# Patient Record
Sex: FEMALE | URBAN - METROPOLITAN AREA
[De-identification: names, ages, dates, MRNs, and addresses within clinical notes are randomized per-mention and may not be internally consistent; named-entity substitution may affect disease eponyms.]

---

## 2021-05-27 ENCOUNTER — APPOINTMENT (OUTPATIENT)
Dept: URBAN - METROPOLITAN AREA CLINIC 257 | Age: 71
Setting detail: DERMATOLOGY
End: 2021-05-27

## 2021-05-27 DIAGNOSIS — L82.1 OTHER SEBORRHEIC KERATOSIS: ICD-10-CM

## 2021-05-27 DIAGNOSIS — L57.0 ACTINIC KERATOSIS: ICD-10-CM

## 2021-05-27 DIAGNOSIS — L81.4 OTHER MELANIN HYPERPIGMENTATION: ICD-10-CM

## 2021-05-27 PROBLEM — D48.5 NEOPLASM OF UNCERTAIN BEHAVIOR OF SKIN: Status: ACTIVE | Noted: 2021-05-27

## 2021-05-27 PROCEDURE — OTHER LIQUID NITROGEN: OTHER

## 2021-05-27 PROCEDURE — OTHER BIOPSY BY SHAVE METHOD: OTHER

## 2021-05-27 PROCEDURE — OTHER COUNSELING: OTHER

## 2021-05-27 PROCEDURE — 11103 TANGNTL BX SKIN EA SEP/ADDL: CPT

## 2021-05-27 PROCEDURE — 17000 DESTRUCT PREMALG LESION: CPT | Mod: 59

## 2021-05-27 PROCEDURE — 11102 TANGNTL BX SKIN SINGLE LES: CPT

## 2021-05-27 PROCEDURE — 99203 OFFICE O/P NEW LOW 30 MIN: CPT | Mod: 25

## 2021-05-27 PROCEDURE — OTHER MIPS QUALITY: OTHER

## 2021-05-27 ASSESSMENT — LOCATION SIMPLE DESCRIPTION DERM
LOCATION SIMPLE: CHEST
LOCATION SIMPLE: LEFT CHEEK
LOCATION SIMPLE: RIGHT CHEEK
LOCATION SIMPLE: RIGHT LIP

## 2021-05-27 ASSESSMENT — LOCATION DETAILED DESCRIPTION DERM
LOCATION DETAILED: STERNAL NOTCH
LOCATION DETAILED: RIGHT UPPER CUTANEOUS LIP
LOCATION DETAILED: RIGHT INFERIOR CENTRAL MALAR CHEEK
LOCATION DETAILED: LEFT MEDIAL MALAR CHEEK

## 2021-05-27 ASSESSMENT — LOCATION ZONE DERM
LOCATION ZONE: TRUNK
LOCATION ZONE: LIP
LOCATION ZONE: FACE

## 2021-05-27 NOTE — PROCEDURE: BIOPSY BY SHAVE METHOD
Was A Bandage Applied: Yes
Hide Second Anesthesia?: No
Electrodesiccation And Curettage Text: The wound bed was treated with electrodesiccation and curettage after the biopsy was performed.
Notification Instructions: Patient will be notified of biopsy results. However, patient instructed to call the office if not contacted within 2 weeks.
Biopsy Method: Dermablade
Silver Nitrate Text: The wound bed was treated with silver nitrate after the biopsy was performed.
Dressing: bandage
Type Of Destruction Used: Curettage
Size Of Lesion In Cm: 0
Information: Selecting Yes will display possible errors in your note based on the variables you have selected. This validation is only offered as a suggestion for you. PLEASE NOTE THAT THE VALIDATION TEXT WILL BE REMOVED WHEN YOU FINALIZE YOUR NOTE. IF YOU WANT TO FAX A PRELIMINARY NOTE YOU WILL NEED TO TOGGLE THIS TO 'NO' IF YOU DO NOT WANT IT IN YOUR FAXED NOTE.
Hemostasis: Drysol
Billing Type: Third-Party Bill
Consent: Written consent was obtained and risks were reviewed including but not limited to scarring, infection, bleeding, scabbing, incomplete removal, nerve damage and allergy to anesthesia.
Electrodesiccation Text: The wound bed was treated with electrodesiccation after the biopsy was performed.
Anesthesia Volume In Cc (Will Not Render If 0): 0.5
Wound Care: Petrolatum
Detail Level: Simple
Biopsy Type: H and E
Post-Care Instructions: I reviewed with the patient in detail post-care instructions. Patient is to keep the biopsy site dry overnight, and then apply bacitracin twice daily until healed. Patient may apply hydrogen peroxide soaks to remove any crusting.
Curettage Text: The wound bed was treated with curettage after the biopsy was performed.
Depth Of Biopsy: dermis
Cryotherapy Text: The wound bed was treated with cryotherapy after the biopsy was performed.
Anesthesia Type: 1% lidocaine with epinephrine
Body Location Override (Optional - Billing Will Still Be Based On Selected Body Map Location If Applicable): left superior anterior neck

## 2021-05-27 NOTE — PROCEDURE: MIPS QUALITY
Detail Level: Detailed
Quality 431: Preventive Care And Screening: Unhealthy Alcohol Use - Screening: Patient screened for unhealthy alcohol use using a single question and scores less than 2 times per year
Quality 110: Preventive Care And Screening: Influenza Immunization: Influenza Immunization Ordered or Recommended, but not Administered due to system reason
Quality 111:Pneumonia Vaccination Status For Older Adults: Pneumococcal Vaccination not Administered or Previously Received, Reason not Otherwise Specified
Quality 130: Documentation Of Current Medications In The Medical Record: Current Medications Documented
Quality 226: Preventive Care And Screening: Tobacco Use: Screening And Cessation Intervention: Patient screened for tobacco use and is an ex/non-smoker

## 2021-05-27 NOTE — PROCEDURE: LIQUID NITROGEN
Duration Of Freeze Thaw-Cycle (Seconds): 1
Render Note In Bullet Format When Appropriate: No
Post-Care Instructions: I reviewed with the patient in detail post-care instructions. Patient is to wear sunprotection, and avoid picking at any of the treated lesions. Pt may apply Vaseline to crusted or scabbing areas.
Detail Level: Simple
Render Post-Care Instructions In Note?: yes
Number Of Freeze-Thaw Cycles: 1 freeze-thaw cycle
Consent: The patient's consent was obtained including but not limited to risks of crusting, scabbing, blistering, scarring, darker or lighter pigmentary change, recurrence, incomplete removal and infection.

## 2021-06-17 ENCOUNTER — APPOINTMENT (OUTPATIENT)
Dept: URBAN - METROPOLITAN AREA CLINIC 257 | Age: 71
Setting detail: DERMATOLOGY
End: 2021-06-17

## 2021-06-17 DIAGNOSIS — L57.8 OTHER SKIN CHANGES DUE TO CHRONIC EXPOSURE TO NONIONIZING RADIATION: ICD-10-CM

## 2021-06-17 DIAGNOSIS — L81.4 OTHER MELANIN HYPERPIGMENTATION: ICD-10-CM

## 2021-06-17 DIAGNOSIS — L57.0 ACTINIC KERATOSIS: ICD-10-CM

## 2021-06-17 PROBLEM — C44.41 BASAL CELL CARCINOMA OF SKIN OF SCALP AND NECK: Status: ACTIVE | Noted: 2021-06-17

## 2021-06-17 PROCEDURE — OTHER LIQUID NITROGEN: OTHER

## 2021-06-17 PROCEDURE — OTHER COUNSELING: OTHER

## 2021-06-17 PROCEDURE — 17003 DESTRUCT PREMALG LES 2-14: CPT

## 2021-06-17 PROCEDURE — OTHER CURETTAGE AND DESTRUCTION: OTHER

## 2021-06-17 PROCEDURE — 17000 DESTRUCT PREMALG LESION: CPT | Mod: 59

## 2021-06-17 PROCEDURE — 17273 DSTR MAL LES S/N/H/F/G 2.1-3: CPT

## 2021-06-17 PROCEDURE — 99213 OFFICE O/P EST LOW 20 MIN: CPT | Mod: 25

## 2021-06-17 ASSESSMENT — LOCATION SIMPLE DESCRIPTION DERM
LOCATION SIMPLE: LEFT UPPER BACK
LOCATION SIMPLE: CHEST
LOCATION SIMPLE: CHEST

## 2021-06-17 ASSESSMENT — LOCATION ZONE DERM
LOCATION ZONE: TRUNK
LOCATION ZONE: TRUNK

## 2021-06-17 ASSESSMENT — LOCATION DETAILED DESCRIPTION DERM
LOCATION DETAILED: LEFT LATERAL SUPERIOR CHEST
LOCATION DETAILED: UPPER STERNUM
LOCATION DETAILED: RIGHT LATERAL SUPERIOR CHEST
LOCATION DETAILED: LEFT MID-UPPER BACK

## 2021-06-17 NOTE — PROCEDURE: LIQUID NITROGEN
Duration Of Freeze Thaw-Cycle (Seconds): 1
Consent: The patient's consent was obtained including but not limited to risks of crusting, scabbing, blistering, scarring, darker or lighter pigmentary change, recurrence, incomplete removal and infection.
Render Post-Care Instructions In Note?: no
Detail Level: Generalized
Number Of Freeze-Thaw Cycles: 1 freeze-thaw cycle
Post-Care Instructions: I reviewed with the patient in detail post-care instructions. Patient is to wear sunprotection, and avoid picking at any of the treated lesions. Pt may apply Vaseline to crusted or scabbing areas.

## 2021-06-17 NOTE — PROCEDURE: CURETTAGE AND DESTRUCTION
Size Of Lesion After Curettage: 2.1
Post-Care Instructions: I reviewed with the patient in detail post-care instructions. Patient is to keep the area dry for 48 hours, and not to engage in any swimming until the area is healed. Should the patient develop any fevers, chills, bleeding, severe pain patient will contact the office immediately.
Biopsy Photograph Reviewed: No
Final Size Statement: The size of the lesion after curettage was
What Was Performed First?: Curettage
Size Of Lesion In Cm: 1.1
Concentration (Mg/Ml Or Millions Of Plaque Forming Units/Cc): 0.01
Consent was obtained from the patient. The risks, benefits and alternatives to therapy were discussed in detail. Specifically, the risks of infection, scarring, bleeding, prolonged wound healing, nerve injury, incomplete removal, allergy to anesthesia and recurrence were addressed. Alternatives to ED&C, such as: surgical removal and XRT were also discussed.  Prior to the procedure, the treatment site was clearly identified and confirmed by the patient. All components of Universal Protocol/PAUSE Rule completed.
Number Of Curettages: 3
Additional Information: (Optional): The wound was cleaned, and a pressure dressing was applied.  The patient received detailed post-op instructions.
Total Volume (Ccs): 1
Bill As A Line Item Or As Units: Line Item
Anesthesia Type: 1% lidocaine with epinephrine
Detail Level: Detailed
Cautery Type: cryotherapy

## 2021-06-21 ENCOUNTER — APPOINTMENT (OUTPATIENT)
Dept: URBAN - METROPOLITAN AREA CLINIC 259 | Age: 71
Setting detail: DERMATOLOGY
End: 2021-06-21

## 2021-06-21 DIAGNOSIS — L82.1 OTHER SEBORRHEIC KERATOSIS: ICD-10-CM

## 2021-06-21 DIAGNOSIS — L57.8 OTHER SKIN CHANGES DUE TO CHRONIC EXPOSURE TO NONIONIZING RADIATION: ICD-10-CM

## 2021-06-21 PROBLEM — C44.41 BASAL CELL CARCINOMA OF SKIN OF SCALP AND NECK: Status: ACTIVE | Noted: 2021-06-21

## 2021-06-21 PROCEDURE — 17312 MOHS ADDL STAGE: CPT | Mod: 79

## 2021-06-21 PROCEDURE — 13132 CMPLX RPR F/C/C/M/N/AX/G/H/F: CPT | Mod: 79

## 2021-06-21 PROCEDURE — OTHER MOHS BY LAYER: OTHER

## 2021-06-21 PROCEDURE — 17311 MOHS 1 STAGE H/N/HF/G: CPT | Mod: 79

## 2021-06-21 PROCEDURE — OTHER COUNSELING: OTHER

## 2021-06-21 PROCEDURE — 99213 OFFICE O/P EST LOW 20 MIN: CPT | Mod: 25,24

## 2021-06-21 PROCEDURE — OTHER MIPS QUALITY: OTHER

## 2021-06-21 ASSESSMENT — LOCATION SIMPLE DESCRIPTION DERM
LOCATION SIMPLE: LEFT ANTERIOR NECK
LOCATION SIMPLE: LEFT UPPER BACK

## 2021-06-21 ASSESSMENT — LOCATION ZONE DERM
LOCATION ZONE: NECK
LOCATION ZONE: TRUNK

## 2021-06-21 ASSESSMENT — LOCATION DETAILED DESCRIPTION DERM
LOCATION DETAILED: LEFT INFERIOR LATERAL NECK
LOCATION DETAILED: LEFT MEDIAL UPPER BACK

## 2021-06-21 NOTE — PROCEDURE: MOHS BY LAYER
Stage 9: Number Of Sections (Blocks) ?: 0
Complex Requirements: Involvement Of Free Margin?: No
Width Of Defect Perpendicular To Closure In Cm (Required): 1.9
Debridement Text: The wound edges were debrided prior to proceeding with the closure to facilitate wound healing.
Wound Care: Petrolatum
Anesthesia Type: 1% lidocaine with epinephrine
Stage 1: Depth Of Layer: adipose tissue
Body Location Override (Optional - Billing Will Still Be Based On Selected Body Map Location If Applicable): Left Superior Anterior Neck
Stage 2: Number Of Sections (Blocks) ?: 1
Suture Removal: 10 days
Deep Sutures: 4-0 Vicryl
Repair Type: Complex Repair
Consent: The rationale for Mohs was explained to the patient and consent was obtained. The risks, benefits and alternatives to therapy were discussed in detail. Specifically, the risks of infection, scarring, bleeding, prolonged wound healing, incomplete removal, allergy to anesthesia, nerve injury and recurrence were addressed. Prior to the procedure, the treatment site was clearly identified and confirmed by the patient. All components of Universal Protocol/PAUSE Rule completed.
Epidermal Closure: running and interrupted
Nostril Rim Text: The closure involved the nostril rim.
Number Of Stages: 2
Hemostasis: Electrocautery
Vermilion Border Text: The closure involved the vermilion border.
Retention Suture Text: Retention sutures were placed to support the closure and prevent dehiscence.
Complex Requirements: Extensive Undermining Performed?: Yes
Helical Rim Text: The closure involved the helical rim.
Size Of Lesion: 1.5
Undermining Type: Entire Wound
Detail Level: Detailed
Epidermal Sutures: 5-0 Nylon
Post-Care Instructions: I reviewed with the patient in detail post-care instructions. Patient is not to engage in any heavy lifting, exercise, or swimming for the next 14 days. Should the patient develop any fevers, chills, bleeding, severe pain patient will contact the office immediately.
Distance Of Undermining In Cm (Required): 1.7
Estimated Blood Loss (Cc): minimal
Simple / Intermediate / Complex Repair - Final Wound Length In Cm: 4

## 2021-06-21 NOTE — PROCEDURE: MIPS QUALITY
Quality 226: Preventive Care And Screening: Tobacco Use: Screening And Cessation Intervention: Patient screened for tobacco use and is an ex/non-smoker
Quality 111:Pneumonia Vaccination Status For Older Adults: Pneumococcal Vaccination not Administered or Previously Received, Reason not Otherwise Specified
Quality 130: Documentation Of Current Medications In The Medical Record: Current Medications Documented
Quality 431: Preventive Care And Screening: Unhealthy Alcohol Use - Screening: Patient screened for unhealthy alcohol use using a single question and scores less than 2 times per year
Detail Level: Detailed
Quality 110: Preventive Care And Screening: Influenza Immunization: Influenza Immunization Ordered or Recommended, but not Administered due to system reason

## 2021-06-30 ENCOUNTER — APPOINTMENT (OUTPATIENT)
Dept: URBAN - METROPOLITAN AREA CLINIC 257 | Age: 71
Setting detail: DERMATOLOGY
End: 2021-06-30

## 2021-06-30 DIAGNOSIS — Z85.828 PERSONAL HISTORY OF OTHER MALIGNANT NEOPLASM OF SKIN: ICD-10-CM

## 2021-06-30 PROCEDURE — OTHER SUTURE REMOVAL (GLOBAL PERIOD): OTHER

## 2021-06-30 PROCEDURE — OTHER COUNSELING: OTHER

## 2021-06-30 ASSESSMENT — LOCATION DETAILED DESCRIPTION DERM: LOCATION DETAILED: LEFT INFERIOR ANTERIOR NECK

## 2021-06-30 ASSESSMENT — LOCATION SIMPLE DESCRIPTION DERM: LOCATION SIMPLE: LEFT ANTERIOR NECK

## 2021-06-30 ASSESSMENT — LOCATION ZONE DERM: LOCATION ZONE: NECK

## 2021-06-30 NOTE — PROCEDURE: SUTURE REMOVAL (GLOBAL PERIOD)
Add 63335 Cpt? (Important Note: In 2017 The Use Of 71241 Is Being Tracked By Cms To Determine Future Global Period Reimbursement For Global Periods): no
Detail Level: Detailed

## 2022-08-04 ENCOUNTER — APPOINTMENT (OUTPATIENT)
Dept: URBAN - METROPOLITAN AREA CLINIC 257 | Age: 72
Setting detail: DERMATOLOGY
End: 2022-08-04

## 2022-08-04 DIAGNOSIS — L30.0 NUMMULAR DERMATITIS: ICD-10-CM

## 2022-08-04 DIAGNOSIS — L57.8 OTHER SKIN CHANGES DUE TO CHRONIC EXPOSURE TO NONIONIZING RADIATION: ICD-10-CM

## 2022-08-04 DIAGNOSIS — Z85.828 PERSONAL HISTORY OF OTHER MALIGNANT NEOPLASM OF SKIN: ICD-10-CM

## 2022-08-04 DIAGNOSIS — D18.0 HEMANGIOMA: ICD-10-CM

## 2022-08-04 DIAGNOSIS — Z71.89 OTHER SPECIFIED COUNSELING: ICD-10-CM

## 2022-08-04 DIAGNOSIS — D22 MELANOCYTIC NEVI: ICD-10-CM

## 2022-08-04 DIAGNOSIS — L21.8 OTHER SEBORRHEIC DERMATITIS: ICD-10-CM

## 2022-08-04 DIAGNOSIS — L82.1 OTHER SEBORRHEIC KERATOSIS: ICD-10-CM

## 2022-08-04 DIAGNOSIS — L81.4 OTHER MELANIN HYPERPIGMENTATION: ICD-10-CM

## 2022-08-04 PROBLEM — D48.5 NEOPLASM OF UNCERTAIN BEHAVIOR OF SKIN: Status: ACTIVE | Noted: 2022-08-04

## 2022-08-04 PROBLEM — D18.01 HEMANGIOMA OF SKIN AND SUBCUTANEOUS TISSUE: Status: ACTIVE | Noted: 2022-08-04

## 2022-08-04 PROBLEM — D22.5 MELANOCYTIC NEVI OF TRUNK: Status: ACTIVE | Noted: 2022-08-04

## 2022-08-04 PROCEDURE — OTHER DIAGNOSIS COMMENT: OTHER

## 2022-08-04 PROCEDURE — 11102 TANGNTL BX SKIN SINGLE LES: CPT

## 2022-08-04 PROCEDURE — OTHER PRESCRIPTION: OTHER

## 2022-08-04 PROCEDURE — OTHER MIPS QUALITY: OTHER

## 2022-08-04 PROCEDURE — OTHER COUNSELING: OTHER

## 2022-08-04 PROCEDURE — OTHER BIOPSY BY SHAVE METHOD: OTHER

## 2022-08-04 PROCEDURE — 11103 TANGNTL BX SKIN EA SEP/ADDL: CPT

## 2022-08-04 PROCEDURE — 99214 OFFICE O/P EST MOD 30 MIN: CPT | Mod: 25

## 2022-08-04 RX ORDER — KETOCONAZOLE 20 MG/G
CREAM TOPICAL
Qty: 60 | Refills: 3 | Status: ERX | COMMUNITY
Start: 2022-08-04

## 2022-08-04 RX ORDER — CLOBETASOL PROPIONATE 0.5 MG/ML
SOLUTION TOPICAL
Qty: 50 | Refills: 4 | Status: ERX | COMMUNITY
Start: 2022-08-04

## 2022-08-04 RX ORDER — KETOCONAZOLE 20 MG/ML
SHAMPOO, SUSPENSION TOPICAL
Qty: 120 | Refills: 11 | Status: ERX | COMMUNITY
Start: 2022-08-04

## 2022-08-04 RX ORDER — HYDROCORTISONE 25 MG/G
CREAM TOPICAL
Qty: 60 | Refills: 3 | Status: ERX | COMMUNITY
Start: 2022-08-04

## 2022-08-04 ASSESSMENT — LOCATION DETAILED DESCRIPTION DERM
LOCATION DETAILED: LEFT INFERIOR MEDIAL UPPER BACK
LOCATION DETAILED: RIGHT SUPERIOR MEDIAL UPPER BACK
LOCATION DETAILED: INFERIOR THORACIC SPINE
LOCATION DETAILED: LEFT MEDIAL UPPER BACK

## 2022-08-04 ASSESSMENT — LOCATION SIMPLE DESCRIPTION DERM
LOCATION SIMPLE: LEFT UPPER BACK
LOCATION SIMPLE: UPPER BACK
LOCATION SIMPLE: RIGHT UPPER BACK

## 2022-08-04 ASSESSMENT — LOCATION ZONE DERM: LOCATION ZONE: TRUNK

## 2022-08-04 NOTE — PROCEDURE: DIAGNOSIS COMMENT
Render Risk Assessment In Note?: no
Detail Level: Simple
Comment: If fails to resolve RTC for further evaluation and management

## 2022-08-04 NOTE — PROCEDURE: BIOPSY BY SHAVE METHOD
Render Path Notes In Note?: No
Curettage Text: The wound bed was treated with curettage after the biopsy was performed.
X Size Of Lesion In Cm: 0
Cryotherapy Text: The wound bed was treated with cryotherapy after the biopsy was performed.
Depth Of Biopsy: dermis
Notification Instructions: Patient will be notified of biopsy results. However, patient instructed to call the office if not contacted within 2 weeks.
Anesthesia Volume In Cc (Will Not Render If 0): 0.5
Detail Level: Detailed
Electrodesiccation And Curettage Text: The wound bed was treated with electrodesiccation and curettage after the biopsy was performed.
Biopsy Method: Dermablade
Wound Care: Petrolatum
Consent: Written consent was obtained and risks were reviewed including but not limited to scarring, infection, bleeding, scabbing, incomplete removal, nerve damage and allergy to anesthesia.
Post-Care Instructions: I reviewed with the patient in detail post-care instructions. Patient is to keep the biopsy site dry overnight, and then apply bacitracin twice daily until healed. Patient may apply hydrogen peroxide soaks to remove any crusting.
Biopsy Type: H and E
Silver Nitrate Text: The wound bed was treated with silver nitrate after the biopsy was performed.
Was A Bandage Applied: Yes
Information: Selecting Yes will display possible errors in your note based on the variables you have selected. This validation is only offered as a suggestion for you. PLEASE NOTE THAT THE VALIDATION TEXT WILL BE REMOVED WHEN YOU FINALIZE YOUR NOTE. IF YOU WANT TO FAX A PRELIMINARY NOTE YOU WILL NEED TO TOGGLE THIS TO 'NO' IF YOU DO NOT WANT IT IN YOUR FAXED NOTE.
Electrodesiccation Text: The wound bed was treated with electrodesiccation after the biopsy was performed.
Anesthesia Type: 1% lidocaine with epinephrine
Billing Type: Third-Party Bill
Dressing: bandage
Type Of Destruction Used: Curettage
Hemostasis: Drysol

## 2023-10-09 ENCOUNTER — TRANSFERRED RECORDS (OUTPATIENT)
Dept: HEALTH INFORMATION MANAGEMENT | Facility: CLINIC | Age: 73
End: 2023-10-09

## 2023-12-21 ENCOUNTER — TRANSFERRED RECORDS (OUTPATIENT)
Dept: HEALTH INFORMATION MANAGEMENT | Facility: CLINIC | Age: 73
End: 2023-12-21

## 2024-03-01 ENCOUNTER — TRANSCRIBE ORDERS (OUTPATIENT)
Dept: ONCOLOGY | Facility: CLINIC | Age: 74
End: 2024-03-01
Payer: MEDICARE

## 2024-03-01 ENCOUNTER — PATIENT OUTREACH (OUTPATIENT)
Dept: ONCOLOGY | Facility: CLINIC | Age: 74
End: 2024-03-01
Payer: MEDICARE

## 2024-03-01 ENCOUNTER — PRE VISIT (OUTPATIENT)
Dept: ONCOLOGY | Facility: CLINIC | Age: 74
End: 2024-03-01
Payer: MEDICARE

## 2024-03-01 DIAGNOSIS — C79.51 BREAST CANCER METASTASIZED TO BONE (H): Primary | ICD-10-CM

## 2024-03-01 DIAGNOSIS — C50.919 BREAST CANCER METASTASIZED TO BONE (H): Primary | ICD-10-CM

## 2024-03-01 NOTE — PROGRESS NOTES
New Patient Oncology Nurse Navigator Note     Referring provider: Self Referred.      Referring Clinic/Organization: [facility]      Referred to (specialty:) Medical Oncology     Requested provider (if applicable): Dr. Carlos A Caraballo     Date Referral Received: March 1, 2024     Evaluation for:  C50.919, C79.51 (ICD-10-CM) - Breast cancer metastasized to bone (H)      Clinical History (per Nurse review of records provided):      Patient seen at [facility] on 2/15/24 by Dr. Ismael Russell for follow up regarding recent imaging studies and biopsy. Recent biopsy confirms the presence of metastatic breast cancer but interestingly shows the tumor is now HER2 1+ positive as compared to 3+ previously. Discussed HER2 based therapy specifically trastuzumab Deruxtecan. Plan is to discontinue trastuzumab and pertuzumab at this time and start traztuzumab deruxtecan.       Breast cancer presenting with metastatic disease and with management as outlined below    - Chest CT angiogram which extended to the abdomen and pelvis, showed no embolism, marked right mediastinal shift, a very large left pleural effusion, complete left lung atelectasis, right middle lobe atelectasis, no pulmonary nodules, left inferior pleural thickening, 4.4 cm x 1.6 cm, a left breast mass 3.8 cm x 3.2 cm x 2.2 cm, left axillary nodes 1.1 cm, a left thoracic wall soft tissue mass, 2.7 cm x 1.3 cm; sternum, posterior left rib, L2, lytic lesions; subcentimeter left hepatic lobe hypodensity, normal adrenals, mild right hydronephrosis and hydroureter, urinary bladder prolapse, colonic diverticulosis, bowel prolapse into vaginal canal.  - On examination, she had a large left breast mass centrally located, which distorted the nipple areolar complex, almost completely obliterating it.    - 05/10/2017, a thoracentesis yielded 1760 mL of dark burgundy fluid, followed by improvement of her dyspnea.  The next day, a second thoracentesis yielded additional 1200  mL of fluid of similar appearance.  A postprocedure chest x-ray showed a persistent left upper lobe consolidation but the remaining lung reexpanded.   - Breast biopsy showed invasive ductal carcinoma grade 2, estrogen and progesterone receptor immunostain positive in 80% of cells and 70% of cells respectively.  .  The staining intensity score of the HercepTest was 3+ positive, indicating HER-2/lina protein overexpression.   - 05/13/2017, a chest CT angiogram showed right lung emboli, with a relatively small clot burden, but findings suggestive of right-sided heart strain.  The pleural effusion was much smaller, the mediastinal shift resolved, the left breast mass, left axillary lymphadenopathy, borderline mediastinal lymphadenopathy, and bone lesions remains stable.  Enoxaparin was started.  - 05/9/2017 she started paclitaxel, trastuzumab and pertuzumab  - 05/10/2017, her CEA was 163.8 ng/mL, the CA 27.29 was 310 U/mL.  - 06/19/2017, CA-27-29 was 326   - 06/22/2017, a CT scan, compared to previous studies, showed decrease in the left axillary lymphadenopathy, the largest 2.0 cm x 0.9 cm, previously 2.7 cm x 1.5 cm; the left breast mass is 3.1 cm x 1.9 cm, previously 3.1 cm x 2.1 cm; a left internal axillary node was 1.5 cm; only minimal left pleural fluid persistent; pleural thickening and nodularity noted; the left lung consolidation essentially resolved; left interlobar fissure nodularity noted; chronic right middle lobe changes noted; mild hepatic steatosis without focal lesions; bilateral hydronephrosis and hydroureter increased particularly in the right, secondary to bladder prolapse; the uterus absent; lytic bone lesions in the sternum, T10, L2, left eighth rib remains stable without new bone lesions developing.  - 07/31/2017, the 12th and last weekly paclitaxel dose was given.  - 07/31/2017 CA-27-29 was 202    - 08/07/2017 hormonal treatment with anastrazole was added to trastuzumab and pertuzumab  -  08/23/2017, a CT scan showed a left breast mass 2.5 cm x 1.7 cm, previously 3.1 cm x 1.9 cm; left axial lymphadenopathy, the largest 1.7 cm x 0.9 cm previously 2.0 cm x 0.9 cm; left pleural nodularity decreased as did the thickening; no new lung lesions; hepatic steatosis; bilateral hydronephrosis and hydroureter, mildly improved; bladder prolapse; no intra-abdominal or pelvic lymphadenopathy; sternum, T10, L2, left eighth rib lytic lesions, stable, no new ones.  - 08/23/2017 CA 27-29 was 140    - 11/13/2017, a CT scan, compared to 8/23/2017, showed a left axillary node, 1.4 cm x 0.8 cm, previously 1.7 cm x 0.8 cm; no intrathoracic lymphadenopathy, a left breast mass, 2.3 cm x 1.4 cm, previously 2.5 cm x 1.7 cm; chronic lung base scarring, no pulmonary nodules or effusions, normal liver; bilateral decreased hydronephrosis, persistent hydroureter, a properly placed pessary without bladder prolapse; colonic diverticuli, no intra-abdominal pelvic lymphadenopathy, stable sternum, T10, L2, left eighth rib lytic lesions; no new ones. CA-27-29: 100 U/mL.  - 02/05/2018, a CT scan showed a left breast mass, 2.3 cm x 1.4 cm stable; decreased left axillary node, 1.2 cm x 0.8 cm, previously 1.4 cm x 0.8 cm. CA 27-29 was 86.3   - 04/30/2018 CA-27-29 was 74.2    - 07/30/2018 a CT scan showed pulmonary right middle lobe mild scarring or atelectasis, stable; a left breast mass, stable; few scattered left axillary nodes, stable; unremarkable liver; right kidney cortical scarring, stable; no intra-abdominal or pelvic lymphadenopathy; L2 ill-defined sclerotic lesion, stable; left ilium, left eighth rib sclerotic lesions, stable; T10 lytic lesion, stable; sternum mixed lytic sclerotic lesion, stable; no new bone lesions. CA-27-29: 71.5    - 10/22/2018 a CT scan showed left breast postsurgical changes, no intrathoracic lymphadenopathy; a prominent left axillary node; clear lungs except for atelectasis; normal liver and adrenals; left  renal cyst; duplicated right collecting system; colonic diverticula; a pessary; multilevel degenerative lumbar spine changes; a L2 lytic lesion, stable. CA 27-29 was 72.5 U/mL  - 04/08/2019  CA-27-29 was 83.7    - 04/29/2019 a CT scan showed a slowly enlarging hyperenhanced left breast focus, 1.1 cm x 1.0 cm, of concern for viable tumor; the remainder of the left breast mass was unchanged; a borderline enlarged left axillary node, 1.3 cm x 0.8 cm, stable, but with a new nodular enhancement area along the anterior margin which could also represent progression; no intrathoracic lymphadenopathy; mild distal esophageal circumferential thickening, stable; left lung base subpleural nodules, appearing benign, stable; sternum and left eighth rib lesions, stable; left T10 vertebral lucency, compatible with hemangioma, stable; no intrahepatic lesions; normal gallbladder; right kidney cortical thinning; left renal cyst; colonic diverticulosis; no intra-abdominal or pelvic lymphadenopathy; a pessary; a new mild L1 superior endplate compression fracture; multilevel degenerative changes; a left iliac small sclerotic focus, stable; no new bone lesions.   - 07/22/2019 a CT scan showed bilateral pulmonary scarring, no nodules or masses; no pleural effusions; no intrathoracic adenopathy; normal liver; a right kidney cortical defect, stable; partial bilateral  ureteral duplication; no ascites; colonic diverticulosis; a properly placed pessary; no intra-abdominal or pelvic adenopathy; stable sternum, left eighth rib, L2, left ilium, sclerotic metastasis; no new bone lesions; degenerative changes. CA 27.29 was 82.0.  - 10/14/2019 a CT scanshowed a left upper lobe pulmonary nodule, 1.0 cm, slightly larger than previously; additional pulmonary nodules remain stable; no intrathoracic lymphadenopathy; no pleural effusion; 2 hepatic lesions, one in the right, one in the left lobe, not previously definitely present; a right renal cortical  defect; no intra-abdominal or pelvic lymphadenopathy; colonic diverticulosis; a pessary in place; sternal, left eighth rib and L2 metastasis, stable; a small left ilium sclerotic lesion. CA 27-29 was 89.8 units/mL.  - 01/06/2020 CT showed no significant change. CA 27-29 was 87.3  - 03/03/2020 CT showed   1. no intrathoracic lymphadenopathy; no pleural effusions; a few left cardiophrenic lymph nodes, up to 1.0 cm x 0.5 cm perhaps slightly increased in size; left breast asymmetric retroareolar density, stable; left axillary and lateral left chest wall nodes, less than 1 cm, stable; a left apical pleural based density, 1.0 cm x 0.8 cm, stable; a new right upper lobe groundglass nodular opacity, 2 cm  2. interval development of multiple liver lesions,, others increased from previous study, including a right liver lesion, 1.5 cm x 1.0 cm; no intra-abdominal or pelvic lymphadenopathy; a  3.  skeletal metastasis, including sternum, lumbar and lower thoracic spine, posterior left ribs, left iliac bone and right proximal femur, stable  CEA was 123.6 and CA 27-29 was 89.6    04/06/2020 anastrozole was discontinued and she started fulvestrant with plan to add abemaciclib. Monoclonal antibody therapy was continued    06/22/2020 echo showed LVEF was between 60-65%  CT scan, compared to 3/30/2020, showed  1. no new or suspicious pulmonary nodules; a few small left pleural-based lesions, stable; a left axillary node, 1.1 cm x 0.7 cm, previously 1.3 cm x 0.7 cm; an AP window lymph node 1.2 cm x 0.5 cm, previously 1.6 cm x 0.7 cm; postsurgical left breast changes  2. hepatic lesions, and the right lobe is 0.9 cm x 0.7 cm, previously 1.1 cm x 0.8 cm; a left hepatic lobe lesion 1.6 cm x 1.5 cm, previously 2.0 cm x 1.7 cm;  3. no adrenal nodules; bilateral duplicated renal collecting systems; hiatal hernia  4. indeterminate mild distal esophageal and GE junction thickening, stable  5.  no intra-abdominal or pelvic lymphadenopathy; a  vaginal pessary; umbilical hernia  6. stable skeletal disease in addition to degenerative changes.  CEA was 55.8 and CA 27.29 was 86.2    06/29/2020 abemaciclib was stopped due to GI toxicity    08/31/2020 CEA was 36.6 and CA 27.29 was 76.3    09/16/2020 CT showed stable appearance of metastatic disease  Echo showed LVEF 62%    09/21/2020 ribociclib was stopped (date of start unclear)    12/07/2020 CT showed  1. left breast postsurgical changes; no axillary or intrathoracic lymphadenopathy; a right upper lobe nonspecific subpleural nodule, 0.3 cm, may be new; right middle lobe scarring  2. stable hepatic lesions; hepatic hemangioma; normal adrenals; upper pole right kidney scarring; bilateral duplicated collecting system  3. small hiatal hernia; colonic diverticula; a pessary; no intra-abdominal lymphadenopathy  4. stable osseous lesions compatible with metastasis.  CEA was 23 and CA 27-29 was 51.8    -03/01/2021, a CT scan, compared to 12/7/2020, showed  1. pulmonary right middle lobe and lingula mild scarring or atelectasis; a right upper lobe nodule resolved; no pleural effusion  2.  no intrathoracic lymphadenopathy; low-attenuation hepatic lesions, unchanged  3. no intra-abdominal or pelvic lymphadenopathy; chronic diverticulosis; small hiatal hernia; vaginal pessary   4.  mixed lytic and sclerotic sternal, left eighth rib, L2 vertebral body, left hilum lesions, no new skeletal abnormalities, global degenerative changes  03/01/2021 echo showed LVEF was 65-70%  She was diagnosed with COVID 19 in March 2021    -06/15/2021, a CT scan, compared to 3/1/2021, showed  1.no intrathoracic lymphadenopathy; no pleural effusion; left breast postsurgical changes  2. no pulmonary infiltrates or masses; mild air bronchograms; linear pulmonary density  3. stable peripheral hepatic lesions; right kidney cortical scarring; no intra-abdominal or pelvic lymphadenopathy; a pessary in place  4.  mixed lytic and sclerotic sternum,  left eighth rib, L2 vertebral body, right proximal femur, left iliac bone mixed lytic and sclerotic changes, stable, no new bone abnormalities.  Echo showed LVEF was 60-65%    -9/13/2021, a CT scan, compared to 6/15/2021, showed  1. left pleural based soft tissue density, 2.6 cm x 0.9 cm, slightly larger; no intrathoracic lymphadenopathy; a left axillary node, 1 cm x 0.7 cm, stable; no new pulmonary nodules  2.  multiple intrahepatic metastasis a few of these were slightly increased: In the left lobe, 1.7 cm x 1.2 cm, previously 1.5 cm x 1.0 cm  3.  normal gallbladder, spleen, pancreas, adrenals, right renal scarring; duplicated renal collecting systems and ureters with chronic hydroureteronephrosis; a questionable mild distal esophageal circumferential thickening; hiatal hernia; colonic diverticulosis  4. no intra-abdominal or pelvic lymphadenopathy; a vaginal pessary  5. stable appearing bone metastasis; multilevel degenerative changes.    09/20/2021 CA27-29 was 51    12/6/2021 CT scan, compared to 9/13/2021, showed  1. left breast postsurgical changes; no intrathoracic lymphadenopathy; scattered vascular calcifications; right middle lobe linear atelectasis; a pleural based left lower lobe mass, 2.7 cm x 0.9 cm, previously 2.6 cm x 0.9 cm; no new nodules or masses  2. multiple hepatic lesions, stable; normal spleen, pancreas, gallbladder, adrenals; right renal chronic cortical scarring; bilateral duplicated collecting systems with persistent mild dilatation; small hiatal hernia  3. no gastrointestinal tract abnormalities except for colonic diverticulosis; a pessary; normal uterus  4. no intra-abdominal or pelvic lymphadenopathy; rib, thoracic and lumbar spine, sternum, lesions, stable  CA 27-29 was 51  12/06/2021 LVEF was 62%    02/28/2022 LVEF was 60-65%  2/28/2022, a CT scan, compared to 12/6/2021, showed  1.no pulmonary nodules; a posterior left mid chest pleural space soft tissue density, 2.0 cm x 0.9 cm,  stable; no effusions; bilateral fat containing Bochdalek hernias; no intrathoracic lymphadenopathy; small hiatal hernia; left breast mass like posttreatment changes, 1.6 cm x 1.1 cm, unchanged  2  multiple, stable, mixed attenuation hepatic lesions including left lateral, 3.2 cm x 2.3 cm; a right hepatic lobe lesion, 2.1 cm x 1.9 cm; normal gallbladder, pancreas, spleen, adrenals, kidneys, anteverted uterus, pessary in place, colonic diverticulosis  3. no peritoneal abnormality; no intra-abdominal or pelvic lymphadenopathy; deep subcutaneous bilateral gluteal fat stranding, nonspecific;  4. mixed lytic sclerotic sternal, spinal and rib, pelvis and right proximal femur lesions  CA 27-29 was 55.3    05/23/2022 CT showed  1. no intrathoracic lymphadenopathy; vascular calcifications; left lateral breast posttreatment changes; subpleural nodularity and thickening up to 3.0 cm x 1.0 cm, unchanged  2. Three discrete hepatic lesions, the largest 2.3 cm x 1.5 cm, previously 1.9 cm x 1.3 cm, the other 2 lesions remain stable; normal spleen, pancreas, gallbladder, adrenals and kidneys, hiatal hernia; colonic diverticula; a pessary in place  3. no intra-abdominal or pelvic lymphadenopathy  4.  mixed sclerotic and lytic lesions in the sternum, left eighth rib, thoracic and lumbar vertebra, pelvis and right femur.  LVEF was 60-65%  CA 27-29 was 68.2      -06/28/2022, a cervical and thoracic spine MR showed multilevel degenerative changes, including foraminal narrowing and possible nerve impingement at several levels, and L2 lesion of concern for metastasis, no thoracic spine metastasis, no spinal canal or cord impingement.    -08/15/2022 LVEF was 70-75%  08/15/2022 CT showed  1. no pulmonary nodules; a posterior left mid chest pleural soft tissue density, 2.6 cm x 0.8 cm, unchanged; no pleural effusion; no intrathoracic lymphadenopathy; small hiatal hernia; a left breast mass, 1.8 cm x 1.6 cm with nipple retraction  2. right  hepatic lobe lesion, 2.3 cm x 1.8 cm, previously 2.2 cm x 1.6 cm; additional left hepatic lobe lesion, 2.7 cm x 1.7 cm; a third, right hepatic lobe lesion, 1.2 cm,  all stable;  3. normal gallbladder, pancreas, spleen, adrenals, kidneys, a pessary in place; colonic diverticulosis; no peritoneal lesions  4. sternum, spine and rib, pelvis, right proximal femur, scattered mixed lytic and sclerotic lesions; a slightly enlarged L2 lytic lesion, 2.1 cm x 0.8 cm, previously 1.6 cm x 0.6 cm, no new metastatic sites  CA 27-29 was 54.6    -11/07/2022 LVEF 66%  -11/07/2022 CT showed  1. Mild interval enlargement of the lateral right hepatic lobe metastasis  2.  Mild interval enlargement of the lytic L2 metastasis  3.  No new metastatic disease  CA 27-29 was 74.6    CT of the chest, abdomen pelvis on 1/23/2023 shows no change in the appearance of the subareolar left breast mass.  A left pleural base mass was felt to be stable to minimally enlarged.  Metastatic disease in the lateral right lobe of the liver was felt to be minimally increased in size.  Further, there is slight increase in the size of the lytic lesion involving L2.  CA 27-29 on 1/23/2023 was 85.1    CT of the chest, abdomen pelvis on 4/24/2023 shows no change in the left subareolar breast mass.  There is stable appearance of pleural-based posterior left chest mass.  There was increase in the size of intrahepatic lesions as compared to the scan from 1/23/2023.  Specifically an enhancing lesion in the superior lateral right lobe of the liver measured 3 x 2.7 cm as compared to 2.7 x 2.1 cm previously.  A separate lesion in the left lobe of the liver measured 3.6 x 4.9 cm.  On previous exam this area measured 4 x 2.5 cm.  Lastly, an enhancing mass in for right hepatic lobe was 1.7 cm and was stable.    Echocardiogram on 4/24/2023 shows normal left ventricular size and wall thickness with an ejection fraction of 65 to 70%.    CT of the chest, abdomen, and pelvis on  06/26/2023 shows stable subareolar left breast mass as well as grossly stable hepatic and osseous metastatic disease. There is stable appearance of pleural nodularity in the posteromedial mid to inferior left chest.    07/12/2023: ECHO shows normal LVEF 60-65%. GLS borderline at -17%.    Echocardiogram on 08/07/2023 showed normal left ventricular size and wall thickness with an ejection fraction of 65 to 70%.    MRI scan of the lumbar spine on 08/30/2023 showed diffusely decreased T1/T2 marrow signal throughout the L2 vertebral body with low-level STIR hyperintensity and mildly expansile-appearing lesion within the left dorsal vertebral body extending to the pedicle. Multilevel spondylosis and spondylolisthesis was described.     She received 3000 cGy radiation to the L1-L2 spine region given at 10 fractions from 09/20/2023-10/03/2023.    10/16/2023: CA 27-29: 131    11/20/2023: CT CAP shows there is slight worsening of the patient's metastatic disease. Stable subareolar left breast mass. There is mild increase in size of liver metastatic disease. Stable skeletal metastases. Stable pleural metastatic disease on the left.    11/20/2023: Bone scan shows skeletal metastases are identified including the sternum, upper lumbar spine and left rib. Nonspecific area of increased activity in the anterior lateral lower right rib.    12/18/2023: CA 27-29: 123.    01/18/2024: CT CAP with contrast 1/18/24 Modest increase in size of by lobar hepatic metastases when compared to 11/20/2023.  Stable metastatic disease in the axial skeleton. Pleural-based metastases in left hemithorax, unchanged. Left breast mass stable  In the body of the report.... an infiltrative mass with a soft tissue component involving the body of the sternum.  See series 5, image 48.    Obtaining biopsy of sternal mass, ordered 1/29/24    Echocardiogram on 11/16/2023 showed normal left ventricular size with ejection fraction of 70%.    Bone scan from  11/20/2023 showed skeletal metastases identified including the sternum, upper lumbar spine, and left rib.    CT of the chest, abdomen and pelvis on 11/20/2023 showed slight worsening of the previously described metastatic disease with mild increase in the size of the liver metastases. Scattered pleural disease was felt to be stable.    CT of the chest, abdomen and pelvis on 01/18/2024 showed modest increase in size of bilobar hepatic metastases confirmed with prior study of 11/20/2023. Pleural-based metastases in the left hemithorax were unchanged. There was stable appearance of metastatic disease in the axial skeleton.    Biopsy of the peristernal mass on 02/09/2024 showed metastatic breast cancer with studies demonstrating the cancer to be estrogen and progesterone receptor positive and HER-2 negative (score 1+) with Ki-67 of 25%.    Echocardiogram on 02/13/2024 showed left ventricular ejection fraction of 60 to 65%.     Records Location: Care Everywhere and Media     Records Needed: NA     Additional testing needed prior to consult: NA    Payor: BCBS / Plan: [employer]BS OF [place] / Product Type: Indemnity /     March 1, 2024      Called patient to introduced myself and role as nurse navigator with [facility] department and to inform them that we have received a self referral for a diagnosis of breast cancer. Patient confirms  that she would like to see Dr. Caraballo for a second opinion as she has just recently changed her treatment due to progression. Provided them with contact information for NPS and encourage them to call with any questions. Patient verbalized understanding of plan. Transferred her to NPS to schedule.     Edna Ivy, RN, BSN  [facility]ematology/Oncology Nurse Navigator  [phone]

## 2024-03-01 NOTE — TELEPHONE ENCOUNTER
Action March 1, 2024 2:17 PM ABT   Action Taken Warm Transfer from [facility] - NPS    Spoke to patient, states that she's had a few bx and no surgeries - dx in 2017.  Had chemo and radiation (Dr. Andrade w/ [facility] at the end of 2023). PT also states no surgeon or GC notes/reports.    Patient has not had Mammo or US breast since 2017 when dx. Pt's had many CT's, Echos, MRI and NM    [facility] ETHAN emailed to pt @ [email or web address]     RECORDS STATUS - BREAST    RECORDS REQUESTED FROM: [facility], [facility]   DATE REQUESTED:    NOTES DETAILS STATUS   OFFICE NOTE from referring provider SELF/2nd opinion    OFFICE NOTE from medical oncologist -[facility] 02/15/24: Dr. Ismael Russell   OFFICE NOTE from surgeon [facility] 03/01[facility] General Surgery:  Dr. Alie Tucker   OFFICE NOTE from radiation oncologist     DISCHARGE SUMMARY from hospital     DISCHARGE REPORT from the ER     OPERATIVE REPORT     MEDICATION LIST     LABS     PATHOLOGY REPORTS  (Tissue diagnosis, Stage, ER/CA percentage positive and intensity of staining, HER2 IHC, FISH, and all biopsies from breast and any distant metastasis)                 Report in [facility] 02/09/24: [id number]  05/10/17: [id number]   IMAGING (NEED IMAGES & REPORT)     CT SCANS Req 03/01-RV RV:  01/18/24-06/22/17: CT CAP   MRI Req 03/01-RV RV:  06/28/22: MR T-Spine  06/28/22: MR C-Spine   MAMMO Req 03/01-RV RV:  05/10/17   ULTRASOUND     PET     BONE SCAN Req 03/01-RV RV:  11/20/23   BRAIN MRI

## 2024-03-04 NOTE — TELEPHONE ENCOUNTER
RECORDS STATUS - BREAST    Action    Action Taken 3/4/24  Spoke maria luisa/ Catarina @ [facility] - they will fax records shortly.     [facility], sent to HIM for STAT upload, emailed to [facility] email, CC Edna HARRIS     Imaging resolved, Except for Mammo. Email to  DATA IMAGING to resolve MG.  1:11 PM    3/6/24  Imaging resolved    Spoke w/ Jessica @ [facility] - advised no office notes for Dr. Alie Tucker.  11:44 AM      RECORDS REQUESTED FROM: [facility]   DATE REQUESTED:    NOTES DETAILS STATUS   OFFICE NOTE from medical oncologist [facility] - [facility] Dr. Ismael Russell: 2/15/24   OFFICE NOTE from radiation oncologist [facility] - Received 3/4 Dr. Sharlene Andrade: 10/9/23   MEDICATION LIST [facility], [facility]   LABS     PATHOLOGY REPORTS  (Tissue diagnosis, Stage, ER/WA percentage positive and intensity of staining, HER2 IHC, FISH, and all biopsies from breast and any distant metastasis)                 [facility], Reports in [facility], slides requested 3/4  [employer]: [id number]  5/10/17: [id number]   IMAGING (NEED IMAGES & REPORT)     CT SCANS PACS 17 - 24: [facility]   MRI PACS 22: [facility]   MAMMO PACS 5/10/17: [facility]

## 2024-03-06 NOTE — TELEPHONE ENCOUNTER
Action March 6, 2024 3:51 PM    Action Taken Slides from [facility] received and taken to 5th floor path lab for review.  2/9/24: [id number]  5/10/17: [id number]  (21 slides)

## 2024-03-07 ENCOUNTER — LAB REQUISITION (OUTPATIENT)
Dept: LAB | Facility: CLINIC | Age: 74
End: 2024-03-07
Payer: MEDICARE

## 2024-03-07 PROCEDURE — 88321 CONSLTJ&REPRT SLD PREP ELSWR: CPT | Performed by: PATHOLOGY

## 2024-03-07 PROCEDURE — 2894A PATHOLOGY CONSULT: CPT | Mod: GC | Performed by: PATHOLOGY

## 2024-03-07 PROCEDURE — 88377 M/PHMTRC ALYS ISHQUANT/SEMIQ: CPT | Performed by: INTERNAL MEDICINE

## 2024-03-11 NOTE — PROGRESS NOTES
MEDICAL ONCOLOGY NEW PATIENT NOTE      Ismael Russell M.D.  [facility]  [address] [place] [address] , [address]  [place] [address]      Re. Penny Lo  Female, 73 year old, 1950  MRN: [id number]    March 12, 2024    Dear Dr. Russell    Thank you for allowing us to participate in the care of Penny Lo.  She has a 73-year-old postmenopausal woman with a clinical cT2 N1 M1 invasive carcinoma of the left breast estrogen receptor positive, progesterone receptor positive, HER2 positive grade 2.  She is seeing us today to discuss second line Enhertu after being on Alesha for 7 years.  She started on Alesha with her initial presentation with metastatic breast cancer ER positive MO positive and HER2 positive beginning in the spring 2017.  She is now here to discuss second line Enhertu.  We also discussed the SYJ602 and HER2 clinical trial but she does not want to be treated on a clinical trial.  She was seen with her significant other Georgi.    HISTORY OF PRESENT ILLNESS:    Breast cancer ER positive, MO positive, and HER2 positive, presenting with metastatic disease with a left breast mass and pulmonary emboli and left pleural effusion. Clinical stage [id number].    - Chest CT angiogram which extended to the abdomen and pelvis, showed no embolism, marked right mediastinal shift, a very large left pleural effusion, complete left lung atelectasis, right middle lobe atelectasis, no pulmonary nodules, left inferior pleural thickening, 4.4 cm x 1.6 cm, a left breast mass 3.8 cm x 3.2 cm x 2.2 cm, left axillary nodes 1.1 cm, a left thoracic wall soft tissue mass, 2.7 cm x 1.3 cm; sternum, posterior left rib, L2, lytic lesions; subcentimeter left hepatic lobe hypodensity, normal adrenals, mild right hydronephrosis and hydroureter, urinary bladder prolapse, colonic diverticulosis, bowel prolapse into vaginal canal.  - On examination, she had a large left breast  mass centrally located, which distorted the nipple areolar complex, almost completely obliterating it.    - 05/10/2017, a thoracentesis yielded 1760 mL of dark burgundy fluid, followed by improvement of her dyspnea.  The next day, a second thoracentesis yielded additional 1200 mL of fluid of similar appearance.  A postprocedure chest x-ray showed a persistent left upper lobe consolidation but the remaining lung reexpanded.   - Breast biopsy showed invasive ductal carcinoma grade 2, estrogen and progesterone receptor immunostain positive in 80% of cells and 70% of cells respectively.  .  The staining intensity score of the HercepTest was 3+ positive, indicating HER-2/lina protein overexpression.     DIAGNOSTIC AND TREATMENT SUMMARY:  - 05/13/2017, a chest CT angiogram showed right lung emboli, with a relatively small clot burden, but findings suggestive of right-sided heart strain.  The pleural effusion was much smaller, the mediastinal shift resolved, the left breast mass, left axillary lymphadenopathy, borderline mediastinal lymphadenopathy, and bone lesions remains stable.  Enoxaparin was started.  - 05/9/2017 she started paclitaxel, trastuzumab and pertuzumab  - 06/22/2017, a CT scan, compared to previous studies, response  - 07/31/2017, the 12th and last weekly paclitaxel dose was given.  - 07/31/2017 CA-27-29 was 202    - 08/07/2017 hormonal treatment with anastrazole was added to trastuzumab and pertuzumab  - 08/23/2017, a CT scan showed a left breast mass 2.5 cm x 1.7 cm, previously 3.1 cm x 1.9 cm; left axial lymphadenopathy, the largest 1.7 cm x 0.9 cm previously 2.0 cm x 0.9 cm; left pleural nodularity decreased as did the thickening; no new lung lesions; hepatic steatosis; bilateral hydronephrosis and hydroureter, mildly improved; bladder prolapse; no intra-abdominal or pelvic lymphadenopathy; sternum, T10, L2, left eighth rib lytic lesions, stable, no new ones.  - 11/13/2017, a CT scan, compared to  8/23/2017, showed response  04/06/2020 anastrozole was discontinued and she started fulvestrant with plan to add abemaciclib. Antibody therapy was continued  06/29/2020 abemaciclib was stopped due to GI toxicity  09/21/2020 ribociclib was stopped (date of start unclear)   She was diagnosed with COVID 19 in March 2021  -06/28/2022, a cervical and thoracic spine MR showed multilevel degenerative changes, including foraminal narrowing and possible nerve impingement at several levels, and L2 lesion of concern for metastasis, no thoracic spine metastasis, no spinal canal or cord impingement.    MRI scan of the lumbar spine on 08/30/2023 showed diffusely decreased T1/T2 marrow signal throughout the L2 vertebral body with low-level STIR hyperintensity and mildly expansile-appearing lesion within the left dorsal vertebral body extending to the pedicle. Multilevel spondylosis and spondylolisthesis was described.     She received 3000 cGy radiation to the L1-L2 spine region given at 10 fractions from 09/20/2023-10/03/2023.    Obtaining biopsy of sternal mass, ordered 1/29/24    Bone scan from 11/20/2023 showed skeletal metastases identified including the sternum, upper lumbar spine, and left rib.    CT of the chest, abdomen and pelvis on 01/18/2024 showed modest increase in size of bilobar hepatic metastases confirmed with prior study of 11/20/2023. Pleural-based metastases in the left hemithorax were unchanged. There was stable appearance of metastatic disease in the axial skeleton.    Biopsy of the peristernal mass on 02/09/2024 showed metastatic breast cancer with studies demonstrating the cancer to be estrogen and progesterone receptor positive and HER-2 negative (score 1+) with Ki-67 of 25%.    Echocardiogram on 02/13/2024 showed left ventricular ejection fraction of 60 to 65%.       PAST MEDICAL HISTORY:    She has no history of breast surgery in the past.  She presented with metastatic disease.  Her history of  radiation therapy is confined to radiation of the spine.  She has no history of prior tumor of any kind.  She has no history of heart problems or heart attack.  No history of breathing problems.  She does have a history of blood clots and presented with pulmonary emboli in 2017.  She was on an anticoagulant which may have been Lovenox.  She has no history of seizures, peptic ulcer disease, osteoporosis.  She does have a history of spinal metastases.  She does have a history of arthritis.  She is not currently participating in a clinical trial and is not interested in participating in a clinical trial.      FAMILY HISTORY:     There is no family history of breast cancer.  No family history of colon cancer.  Her father did have prostate cancer.  No history of gastric cancer, ovarian cancer, uterine cancer, melanoma.  Her brother had a history of lymphoma as well as tongue cancer as well as lip cancer and is a non-smoker.  No male family members with breast cancer.      PAST MENSTRUAL HISTORY:    Age of first menstrual period approximately 12.  She is pregnant 6 times with 2 live births and 3 abortions and 1 miscarriage.  Age of first pregnancy 20.  Age at last pregnancy 24.  Her last menstrual period was at age 40 and menopause occurred spontaneously.  Uterus and ovaries are both in place.  She has never had hormone replacement therapy.     HABITS:  She is a never smoker.  She had moderate alcohol consumption but quit several years ago.    GERMLINE GENETICS:  Not yet done per patient report.      NGS:  Not done per patient report.    ALLERGIES:  No drug allergies.  No allergy to seafood, iodine, or contrast dye.  She does not take aspirin.    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     TREATMENT HISTORY:  A. - 05/9/2017 she started paclitaxel, trastuzumab and pertuzumab  B.  - 08/07/2017 hormonal treatment with anastrazole was added to trastuzumab and pertuzumab  C.  04/06/2020 anastrozole  was discontinued and she started fulvestrant with plan to add abemaciclib. Pertuzumab/trastuzumab therapy was continued  D.  06/29/2020 abemaciclib was stopped due to GI toxicity  E.  09/21/2020 ribociclib was stopped (date of start unclear)  F.  She received 3000 cGy radiation to the L1-L2 spine region given at 10 fractions from 09/20/2023-10/03/2023.  G.  Enhertu discussed with patient.      INTERVAL HISTORY:  Patient seen at [facility] on 2/15/24 by Dr. Ismael Russell for follow up regarding recent imaging studies and biopsy. Recent biopsy confirms the presence of metastatic breast cancer but interestingly shows the tumor is now HER2 1+ positive as compared to 3+ previously. Discussed HER2 based therapy specifically trastuzumab Deruxtecan. Plan is to discontinue trastuzumab and pertuzumab at this time and start traztuzumab deruxtecan.  She was seen with her significant other Georgi.    She has no pain, mild fatigue, no depression or anxiety.  She has an ECOG 0 performance status.    REVIEW OF SYSTEMS:   She has had no fevers, headaches, cough, chest pain, shortness of breath, hemoptysis, loss of appetite, nausea, vomiting, abdominal pain, constipation, diarrhea, bone pain, back pain, muscle or joint complaints, numbness or tingling in hands and feet, hearing loss or depression.  The remainder of a 14-point review of systems is negative.     PHYSICAL EXAMINATION:     VITALS:  BP (!) 147/73 (BP Location: Right arm, Patient Position: Sitting, Cuff Size: Adult Regular)   Pulse 101   Temp 98  F (36.7  C) (Oral)   Resp 16   Ht 1.524 m (5')   Wt 52.6 kg (116 lb)   SpO2 97%   BMI 22.65 kg/m    HEENT:  No alopecia, no lesions in the oropharynx.  dentition.  LYMPH:  There is no palpable cervical, supraclavicular, subclavicular, or axillary lymphadenopathy.  BREASTS:  Examination of the right breast revealed no masses.  Examination of the left breast revealed a retracted left nipple.  There was over lying scab  or eschar.  There were no palpable masses in the left breast.  LUNGS:  Clear to percussion and auscultation.  HEART:  Regular rate and rhythm.  S1, S2.  ABDOMEN:  Soft, nontender, without hepatosplenomegaly.  EXTREMITIES:  Without edema.  PSYCH:  Mood and affect were normal.  NEUROLOGIC:  Mood and affect   Alert and oriented.  Finger-to-nose was intact bilaterally.  Cranial nerves II-XII are intact.  Motor exam showed 5/5 strength in the upper and lower extremities bilaterally.  Deep tendon reflexes were 1+ in the upper and lower extremities bilaterally and toes were down-going bilaterally.    LABORATORY DATA:  None today.     ASSESSMENT AND PLAN:    Penny Lo is a 73-year-old postmenopausal woman with a clinical cT2 N1 M1 invasive carcinoma of the left breast estrogen receptor positive, progesterone receptor positive, HER2 positive grade 2.  I discussed with her that she has done very well with the Alesha regimen for 6 and half years and now has progression.  She has had hormonal therapy intermixed with this regimen with both aromatase inhibitor, fulvestrant and cyclin dependent kinase inhibitors.  I discussed that I do recommend change to the standard of care second line regimen which is trastuzumab deruxtecan.  Discussion of clinical trials options. I did discuss with her that we do not have a slot available for the pre-I spy 1 arm of EEV990 and trastuzumab deruxtecan.  She is not interested in clinical trials and therefore would prefer to be treated with single agent trastuzumab deruxtecan.  Risks and potential benefits of trastuzumab deruxtecan.  For HER2 positive metastatic breast cancer in the second line setting trastuzumab deruxtecan option.  There is significant nausea and vomiting with this regimen and appropriate antiemetic therapy is needed because this is a highly emetogenic regimen.  Nonetheless with use of aprepitant as well as Aloxi nausea can be very well-controlled.  There is significant  fatigue during the first week of each 3-week cycle.  There is also attention that needs to be paid to the possibility of interstitial lung disease.  This agent should be permanently discontinued if there is grade 2 pulmonary toxicity which would be symptomatic interstitial lung disease.  Grade 1 interstitial lung disease can be treated with steroids but whether trastuzumab deruxtecan should be restarted as a matter of debate.  Discussion of goals of care.  Penny recognizes that her metastatic breast cancer is not curable but is treatable.  Quality of life is very important for her and I did discuss the management of treatment related symptoms on the trastuzumab deruxtecan regimen.  A third-line regimen would be the HER2 climb regimen.  In terms of risk for brain metastases we do not recommend routine screening brain MRI although brain MRI could be considered at the time of disease progression.  This being said she does not have any neurologic signs or symptoms and use of brain MRI in the setting is not mandatory.  I would definitely obtain a brain MRI if she develops neurologic signs or symptoms.  I do recommend continuing with a bone targeted agent such as zoledronic acid because of known bone metastases.  Nonetheless I would be sparing in terms of bone targeted agents given the long natural history of patients treated on the Alesha regimen and the risk of osteonecrosis of the jaw.  She does have good dentition.  Bone health.  I did recommend stretch band, hand weights, exercise to maintain bone density.  Calcium 1 g/day by reading labels and vitamin D3 2000 international units/day.  Genetics.  Given her family history of prostate cancer in her father and a brother who had multiple head neck cancers as well as lymphoma, germline genetics is strongly recommended.    NGS sequencing is recommended.  Discussion of exercise.  Discussion of the Lace and Pathway.  We recommend 150 minutes of exercise per week with  mixed cardio and strength training.  Stretch band, hand weights, yoga.  Diet.  I recommended a diet low in saturated fat, but not low in fat with more fruits and vegetables, and less in the way of red meat.  Palliative care consultation is recommended.  Follow-up: We will be happy to see Penny in follow-up in our clinic on an as requested basis.  She will continue with the excellent care of Dr. John Russell.    Thank you Dr. Russell for allowing us to participate in Penny Lo's care.       Sincerely,      Carlos A Caraballo MD  Professor  [facility]  [phone]    ADDENDUM 4-9-24 see below:  Her metastatic breast cancer is HER2 low and could be treated with TDXd.        Authorizing Provider:  Carlos A Caraballo,   Collected:           03/07/2024 03:38 PM                                  MD                                                                           Ordering Location:     [facility]     Received:            03/07/2024 03:39 PM                                  [facility]                                                       Pathologist:           Radha Tran MD                                                   Specimens:   A) - Consult Slide, [id number]                                                                    B) - Consult Slide, [id number]                                                            Amendment    The purpose of this amendment is to correct an editing error in the comment. The ER/PgR/HER2 results for the patient's 2/9/24 madeline-sternal chest core biopsy (outside case [id number]) are ER: Positive (%, strong intensity), PgR: Positive (21-30%, moderate intensity), HER2 1+, as was originally reported. The report is amended to remove additional ER/PgR/HER2 results which were initially placed at the very end of the comment.   Comment: This is an appended report. These results have been appended to a previously final  "verified report.   Addendum   Dr. Tran notified Dr. Carlos A Caraballo of our interpretation of the HER2 immunohistochemistry results for the patient's LEFT breast core biopsy material from 5/10/2017 (outside case [id number], block A1). He requested that HER2 FISH be performed on that block.     We obtained block A1 from outside case [id number]. HER2 FISH has been ordered (all slides created in our lab (H&E recuts and unstained slides for FISH) will be labeled \"[id number]\").     HER2 FISH results will be reported separately by cytogenetics.         ADDENDUM 2-13-23    I. CASE FROM [place], [place] ([id number], OBTAINED 05/10/17):  LEFT breast, 2:00, 1 cm from nipple, mass, ultrasound guided core biopsy:  -INVASIVE BREAST CARCINOMA OF NO SPECIAL TYPE (INVASIVE DUCTAL CARCINOMA), Pepe grade 2 (tubule formation 3 + nuclear pleomorphism 2 + mitotic activity 1 = Newburg score 6)  -Calcifications associated with invasive carcinoma  -See comment     II. CASE FROM [place], [place] ([id number] OBTAINED 02/09/24):  Soft tissue, chest, madeline-sternal mass, core biopsy:  -METASTATIC BREAST DUCTAL CARCINOMA, involving skeletal muscle and fibroconnective tissue   -See comment  According to Dr. Carlos A Caraballo's 3/12/2024 progress note, in the electronic health record, imaging studies at the time of the patient's initial presentation (in 2017) showed evidence of distant metastases (left pleural effusion, left pleural thickening, left thoracic wall mass, and lytic bone lesions), and she had a central LEFT breast mass. The only tissue sampling at the time of presentation was the LEFT breast core biopsy in part I of this report. She received multiple systemic therapies including HER2 targeted therapy, paclitaxel, and endocrine therapy (anastrozole, then fulvestrant). In 2023, she received radiation therapy for spine metastases.     I. LEFT breast core biopsy (outside case [id number] obtained " "5/10/17):  Invasive carcinoma extensively involves the core biopsy material. There are smooth muscle bundles, consistent with tissue from the nipple-areolar region. There are large areas of sclerosis in the tumor. Estrogen receptor (ER), progesterone receptor (PgR) and HER2 immunostains were performed by the referring institution (block A1) and provided for our review with appropriate positive and negative controls. The results are listed below:  ER: Positive (90%, strong intensity)  PgR: Positive (70%, moderate intensity)  HER2: 2+ (complete membranous staining in most tumor cells, but <10% of tumor cells have \"intense\" membranous staining (compared to the provided positive control).       II. Keke-sternal chest core biopsy (outside case [id number] obtained 2/9/24):  Invasive carcinoma extensively involves the core biopsy material. The tumor is morphologically similar to the invasive carcinoma in the patient's 2017 LEFT breast core biopsy (see part I above). The prominent sclerosis in the prior LEFT breast core biopsy material is not seen in this keke-sternal core biopsy material. Immunostains were performed by the referring institution (block A1) and provided for our review with appropriate positive and negative controls. The results are listed below:     ANGELIQUE-3: Positive (strong, diffuse)  TTF-1: Negative  INSM-1: Positive (focal-patchy)  CD56: Negative  ER: Positive (%, strong intensity)  PgR: Positive (21-30%, moderate intensity)  HER2: 1+  Ki-67: 25%  ER: Positive (90%, strong intensity)  PgR: Positive (70%, moderate intensity)  HER2: 2+ (complete membranous staining in most tumor cells, but <10% of tumor cells have \"intense\" membranous staining (compared to the provided positive control).      ADDENDUM2:    This FISH analysis is performed in follow up to the reported equivocal (2+) HER2 findings by immunohistochemistry ([id number]).         RESULTS:     Ratio of HER2/CORNEL-17 signals:  Penny Lo:  " See Interpretation below                         Avg. number HER2 signals/nucleus:  4.6                                                                                                Avg. number CORNEL-17 signals/nucleus:  3.4                                                                                                HER2/CORNEL 17 ratio:  1.4     **Interpretive guidelines per the American Society of Clinical Oncology/College of American Pathologists Clinical Practice Guideline Focused Update (Mónica MELTON et al, 2018, Arch Pathol Lab Med 142:1364; doi:10.5858/arpa.0299-5259-FI):      -- Group 1: HER2/CORNEL-17 ratio 2.0 or more -AND- avg. number HER2 signals/nucleus 4.0 or more (LUIS ANTONIO Positive)  -- Group 2: HER2/CORNEL-17 ratio 2.0 or more -AND- avg. number HER2 signals/nucleus <4.0 (Additional work required)  -- Group 3: HER2/CORNEL-17 ratio <2.0 -AND- avg. number HER2 signals/nucleus 6.0 or more (Additional work required)  -- Group 4: HER2/CORNEL-17 ratio <2.0 -AND- avg. number HER2 signals/nucleus 4.0 or more and <6.0 (Additional work required)  -- Group 5: HER2/CORNEL-17 ratio <2.0 -AND- avg. number HER2 signals/nucleus <4.0 (LUIS ANTONIO Negative)        INTERPRETATION:    Per the American Society of Clinical Oncology/College of American Pathologists Clinical Practice Guideline Focused Update (Mónica MELTON et al, 2018, Arch Pathol Lab Med  doi:10.5858/arpa.0196-8792-DM), the HER2/CORNEL 17 ratio of 1.4 and average number of HER2 signals/cell of 4.6 places this specimen in Group 4.  These results are interpreted as HER2 Negative with the following COMMENT:     It is uncertain whether patients with greater than/equal to 4.0 and less than 6.0 average HER2 signals/cell and HER2/CEP17 ratio less than 2.0 benefit from HER2-targeted therapy in the absence of protein overexpression (IHC 3+). If the specimen test result is close to the LUIS ANTONIO ratio threshold for positive, there is a higher likelihood that repeat testing will result in different results by  chance alone. Therefore, when IHC results are not 3+ positive, it is recommended that the sample be considered HER2 negative without additional testing on the same specimen.        Specimen (formalin-fixed, paraffin-embedded): Case [id number], Block A2 (outside case [id number] Block A1)  Cold ischemia & formalin fixation times reported to meet ASCO / CAP criteria: Yes  Number of cells scored (manual scoring): 60  Probe: [employer] HER2/CORNEL-17 IQFISH pharmDx Probe Mix to HER2 (17q12) and to the centromere region of chromosome 17        ADDITIONAL COMMENTS:  The IQFISH pharmDx test has been approved by the FDA for the evaluation of HER2 (ERBB2) gene amplification status in formalin-fixed, paraffin-embedded breast cancer tissue specimens and gastric or gastroesophageal junction adenocarcinoma.  It is intended for use as an adjunct to other existing clinicopathologic information used to evaluate patients with such tumors. This test was developed and its performance characteristics determined by the [facility], [facility].     Electronically signed by Bre Gentile M.D., [facility] on 4/9/24 at 12:13 AM.          I spent 70 minutes with the patient more than 50% of which was in counseling and coordination of care. The longitudinal plan of care for the diagnosis(es)/condition(s) as documented were addressed during this visit. Due to the added complexity in care, I will continue to support Penny in the subsequent management and with ongoing continuity of care.

## 2024-03-12 ENCOUNTER — PRE VISIT (OUTPATIENT)
Dept: ONCOLOGY | Facility: CLINIC | Age: 74
End: 2024-03-12
Payer: MEDICARE

## 2024-03-12 ENCOUNTER — PATIENT OUTREACH (OUTPATIENT)
Dept: ONCOLOGY | Facility: CLINIC | Age: 74
End: 2024-03-12
Payer: MEDICARE

## 2024-03-12 ENCOUNTER — ONCOLOGY VISIT (OUTPATIENT)
Dept: ONCOLOGY | Facility: CLINIC | Age: 74
End: 2024-03-12
Attending: INTERNAL MEDICINE
Payer: MEDICARE

## 2024-03-12 VITALS
HEIGHT: 60 IN | SYSTOLIC BLOOD PRESSURE: 147 MMHG | BODY MASS INDEX: 22.78 KG/M2 | TEMPERATURE: 98 F | DIASTOLIC BLOOD PRESSURE: 73 MMHG | HEART RATE: 101 BPM | WEIGHT: 116 LBS | OXYGEN SATURATION: 97 % | RESPIRATION RATE: 16 BRPM

## 2024-03-12 DIAGNOSIS — C50.912 CARCINOMA OF LEFT BREAST METASTATIC TO BONE (H): Primary | ICD-10-CM

## 2024-03-12 DIAGNOSIS — C79.51 CARCINOMA OF LEFT BREAST METASTATIC TO BONE (H): Primary | ICD-10-CM

## 2024-03-12 PROCEDURE — 99205 OFFICE O/P NEW HI 60 MIN: CPT | Performed by: INTERNAL MEDICINE

## 2024-03-12 PROCEDURE — G0463 HOSPITAL OUTPT CLINIC VISIT: HCPCS | Performed by: INTERNAL MEDICINE

## 2024-03-12 ASSESSMENT — PAIN SCALES - GENERAL: PAINLEVEL: NO PAIN (0)

## 2024-03-12 NOTE — NURSING NOTE
Oncology Rooming Note    March 12, 2024 12:53 PM   Penny Lo is a 73 year old female who presents for:    Chief Complaint   Patient presents with    Oncology Clinic Visit     Breast cancer metastasized to bone     Initial Vitals: BP (!) 147/73 (BP Location: Right arm, Patient Position: Sitting, Cuff Size: Adult Regular)   Pulse 101   Temp 98  F (36.7  C) (Oral)   Resp 16   Ht 1.524 m (5')   Wt 52.6 kg (116 lb)   SpO2 97%   BMI 22.65 kg/m   Estimated body mass index is 22.65 kg/m  as calculated from the following:    Height as of this encounter: 1.524 m (5').    Weight as of this encounter: 52.6 kg (116 lb). Body surface area is 1.49 meters squared.  No Pain (0) Comment: Data Unavailable   No LMP recorded. Patient is postmenopausal.  Allergies reviewed: Yes  Medications reviewed: Yes    Medications: Medication refills not needed today.  Pharmacy name entered into [facility]: [facility] [id number] [place] [address]    Frailty Screening:   Is the patient here for a new oncology consult visit in cancer care? 1. Yes. Over the past month, have you experienced difficulty or required a caregiver to assist with:   1. Balance, walking or general mobility (including any falls)? NO  2. Completion of self-care tasks such as bathing, dressing, toileting, grooming/hygiene?  NO  3. Concentration or memory that affects your daily life?  NO       Clinical concerns: none.       Jam Dow

## 2024-03-12 NOTE — PROGRESS NOTES
received Cancer Risk Management Program referral, referred for:    breast cancer diagnosis in 2017. We want to offer genetic testing      Reviewed for appropriate plan, and sent to New Patient Scheduling for completion.

## 2024-03-12 NOTE — LETTER
3/12/2024         RE: Penny Lo  [id number] [facility] [id number]        Dear Colleague,    Thank you for referring your patient, Penny Lo, to the [facility]. Please see a copy of my visit note below.    MEDICAL ONCOLOGY NEW PATIENT NOTE      Ismael Russell M.D.  [facility]  [address] [place] [address] , [address]  [place] [address]      Re. Penny Lo  Female, 73 year old, 1950  MRN: [id number]    March 12, 2024    Dear Dr. Russell    Thank you for allowing us to participate in the care of Penny Lo.  She has a 73-year-old postmenopausal woman with a clinical cT2 N1 M1 invasive carcinoma of the left breast estrogen receptor positive, progesterone receptor positive, HER2 positive grade 2.  She is seeing us today to discuss second line Enhertu after being on Alesha for 7 years.  She started on Alesha with her initial presentation with metastatic breast cancer ER positive MS positive and HER2 positive beginning in the spring 2017.  She is now here to discuss second line Enhertu.  We also discussed the [id number] and HER2 clinical trial but she does not want to be treated on a clinical trial.  She was seen with her significant other Georgi.    HISTORY OF PRESENT ILLNESS:    Breast cancer ER positive, MS positive, and HER2 positive, presenting with metastatic disease with a left breast mass and pulmonary emboli and left pleural effusion. Clinical stage [id number].    - Chest CT angiogram which extended to the abdomen and pelvis, showed no embolism, marked right mediastinal shift, a very large left pleural effusion, complete left lung atelectasis, right middle lobe atelectasis, no pulmonary nodules, left inferior pleural thickening, 4.4 cm x 1.6 cm, a left breast mass 3.8 cm x 3.2 cm x 2.2 cm, left axillary nodes 1.1 cm, a left thoracic wall soft tissue mass, 2.7 cm x 1.3 cm; sternum, posterior left rib, L2,  lytic lesions; subcentimeter left hepatic lobe hypodensity, normal adrenals, mild right hydronephrosis and hydroureter, urinary bladder prolapse, colonic diverticulosis, bowel prolapse into vaginal canal.  - On examination, she had a large left breast mass centrally located, which distorted the nipple areolar complex, almost completely obliterating it.    - 05/10/2017, a thoracentesis yielded 1760 mL of dark burgundy fluid, followed by improvement of her dyspnea.  The next day, a second thoracentesis yielded additional 1200 mL of fluid of similar appearance.  A postprocedure chest x-ray showed a persistent left upper lobe consolidation but the remaining lung reexpanded.   - Breast biopsy showed invasive ductal carcinoma grade 2, estrogen and progesterone receptor immunostain positive in 80% of cells and 70% of cells respectively.  .  The staining intensity score of the HercepTest was 3+ positive, indicating HER-2/lina protein overexpression.     DIAGNOSTIC AND TREATMENT SUMMARY:  - 05/13/2017, a chest CT angiogram showed right lung emboli, with a relatively small clot burden, but findings suggestive of right-sided heart strain.  The pleural effusion was much smaller, the mediastinal shift resolved, the left breast mass, left axillary lymphadenopathy, borderline mediastinal lymphadenopathy, and bone lesions remains stable.  Enoxaparin was started.  - 05/9/2017 she started paclitaxel, trastuzumab and pertuzumab  - 06/22/2017, a CT scan, compared to previous studies, response  - 07/31/2017, the 12th and last weekly paclitaxel dose was given.  - 07/31/2017 CA-27-29 was 202    - 08/07/2017 hormonal treatment with anastrazole was added to trastuzumab and pertuzumab  - 08/23/2017, a CT scan showed a left breast mass 2.5 cm x 1.7 cm, previously 3.1 cm x 1.9 cm; left axial lymphadenopathy, the largest 1.7 cm x 0.9 cm previously 2.0 cm x 0.9 cm; left pleural nodularity decreased as did the thickening; no new lung lesions;  hepatic steatosis; bilateral hydronephrosis and hydroureter, mildly improved; bladder prolapse; no intra-abdominal or pelvic lymphadenopathy; sternum, T10, L2, left eighth rib lytic lesions, stable, no new ones.  - 11/13/2017, a CT scan, compared to 8/23/2017, showed response  04/06/2020 anastrozole was discontinued and she started fulvestrant with plan to add abemaciclib. Antibody therapy was continued  06/29/2020 abemaciclib was stopped due to GI toxicity  09/21/2020 ribociclib was stopped (date of start unclear)   She was diagnosed with COVID 19 in March 2021  -06/28/2022, a cervical and thoracic spine MR showed multilevel degenerative changes, including foraminal narrowing and possible nerve impingement at several levels, and L2 lesion of concern for metastasis, no thoracic spine metastasis, no spinal canal or cord impingement.    MRI scan of the lumbar spine on 08/30/2023 showed diffusely decreased T1/T2 marrow signal throughout the L2 vertebral body with low-level STIR hyperintensity and mildly expansile-appearing lesion within the left dorsal vertebral body extending to the pedicle. Multilevel spondylosis and spondylolisthesis was described.     She received 3000 cGy radiation to the L1-L2 spine region given at 10 fractions from 09/20/2023-10/03/2023.    Obtaining biopsy of sternal mass, ordered 1/29/24    Bone scan from 11/20/2023 showed skeletal metastases identified including the sternum, upper lumbar spine, and left rib.    CT of the chest, abdomen and pelvis on 01/18/2024 showed modest increase in size of bilobar hepatic metastases confirmed with prior study of 11/20/2023. Pleural-based metastases in the left hemithorax were unchanged. There was stable appearance of metastatic disease in the axial skeleton.    Biopsy of the peristernal mass on 02/09/2024 showed metastatic breast cancer with studies demonstrating the cancer to be estrogen and progesterone receptor positive and HER-2 negative (score 1+)  with Ki-67 of 25%.    Echocardiogram on 02/13/2024 showed left ventricular ejection fraction of 60 to 65%.       PAST MEDICAL HISTORY:    She has no history of breast surgery in the past.  She presented with metastatic disease.  Her history of radiation therapy is confined to radiation of the spine.  She has no history of prior tumor of any kind.  She has no history of heart problems or heart attack.  No history of breathing problems.  She does have a history of blood clots and presented with pulmonary emboli in 2017.  She was on an anticoagulant which may have been Lovenox.  She has no history of seizures, peptic ulcer disease, osteoporosis.  She does have a history of spinal metastases.  She does have a history of arthritis.  She is not currently participating in a clinical trial and is not interested in participating in a clinical trial.      FAMILY HISTORY:     There is no family history of breast cancer.  No family history of colon cancer.  Her father did have prostate cancer.  No history of gastric cancer, ovarian cancer, uterine cancer, melanoma.  Her brother had a history of lymphoma as well as tongue cancer as well as lip cancer and is a non-smoker.  No male family members with breast cancer.      PAST MENSTRUAL HISTORY:    Age of first menstrual period approximately 12.  She is pregnant 6 times with 2 live births and 3 abortions and 1 miscarriage.  Age of first pregnancy 20.  Age at last pregnancy 24.  Her last menstrual period was at age 40 and menopause occurred spontaneously.  Uterus and ovaries are both in place.  She has never had hormone replacement therapy.     HABITS:  She is a never smoker.  She had moderate alcohol consumption but quit several years ago.    GERMLINE GENETICS:  Not yet done per patient report.      NGS:  Not done per patient report.    ALLERGIES:  No drug allergies.  No allergy to seafood, iodine, or contrast dye.  She does not take aspirin.    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     TREATMENT HISTORY:  A. - 05/9/2017 she started paclitaxel, trastuzumab and pertuzumab  B.  - 08/07/2017 hormonal treatment with anastrazole was added to trastuzumab and pertuzumab  C.  04/06/2020 anastrozole was discontinued and she started fulvestrant with plan to add abemaciclib. Pertuzumab/trastuzumab therapy was continued  D.  06/29/2020 abemaciclib was stopped due to GI toxicity  E.  09/21/2020 ribociclib was stopped (date of start unclear)  F.  She received 3000 cGy radiation to the L1-L2 spine region given at 10 fractions from 09/20/2023-10/03/2023.  G.  Enhertu discussed with patient.      INTERVAL HISTORY:  Patient seen at [facility] on 2/15/24 by Dr. Ismael Russell for follow up regarding recent imaging studies and biopsy. Recent biopsy confirms the presence of metastatic breast cancer but interestingly shows the tumor is now HER2 1+ positive as compared to 3+ previously. Discussed HER2 based therapy specifically trastuzumab Deruxtecan. Plan is to discontinue trastuzumab and pertuzumab at this time and start traztuzumab deruxtecan.  She was seen with her significant other Georgi.    She has no pain, mild fatigue, no depression or anxiety.  She has an ECOG 0 performance status.    REVIEW OF SYSTEMS:   She has had no fevers, headaches, cough, chest pain, shortness of breath, hemoptysis, loss of appetite, nausea, vomiting, abdominal pain, constipation, diarrhea, bone pain, back pain, muscle or joint complaints, numbness or tingling in hands and feet, hearing loss or depression.  The remainder of a 14-point review of systems is negative.     PHYSICAL EXAMINATION:     VITALS:  BP (!) 147/73 (BP Location: Right arm, Patient Position: Sitting, Cuff Size: Adult Regular)   Pulse 101   Temp 98  F (36.7  C) (Oral)   Resp 16   Ht 1.524 m (5')   Wt 52.6 kg (116 lb)   SpO2 97%   BMI 22.65 kg/m    HEENT:  No alopecia, no lesions in the oropharynx.  dentition.  LYMPH:   There is no palpable cervical, supraclavicular, subclavicular, or axillary lymphadenopathy.  BREASTS:  Examination of the right breast revealed no masses.  Examination of the left breast revealed a retracted left nipple.  There was over lying scab or eschar.  There were no palpable masses in the left breast.  LUNGS:  Clear to percussion and auscultation.  HEART:  Regular rate and rhythm.  S1, S2.  ABDOMEN:  Soft, nontender, without hepatosplenomegaly.  EXTREMITIES:  Without edema.  PSYCH:  Mood and affect were normal.  NEUROLOGIC:  Mood and affect   Alert and oriented.  Finger-to-nose was intact bilaterally.  Cranial nerves II-XII are intact.  Motor exam showed 5/5 strength in the upper and lower extremities bilaterally.  Deep tendon reflexes were 1+ in the upper and lower extremities bilaterally and toes were down-going bilaterally.    LABORATORY DATA:  None today.     ASSESSMENT AND PLAN:    Penny Lo is a 73-year-old postmenopausal woman with a clinical cT2 N1 M1 invasive carcinoma of the left breast estrogen receptor positive, progesterone receptor positive, HER2 positive grade 2.  I discussed with her that she has done very well with the Alesha regimen for 6 and half years and now has progression.  She has had hormonal therapy intermixed with this regimen with both aromatase inhibitor, fulvestrant and cyclin dependent kinase inhibitors.  I discussed that I do recommend change to the standard of care second line regimen which is trastuzumab deruxtecan.  Discussion of clinical trials options. I did discuss with her that we do not have a slot available for the pre-I spy 1 arm of EZT136 and trastuzumab deruxtecan.  She is not interested in clinical trials and therefore would prefer to be treated with single agent trastuzumab deruxtecan.  Risks and potential benefits of trastuzumab deruxtecan.  For HER2 positive metastatic breast cancer in the second line setting trastuzumab deruxtecan option.  There is  significant nausea and vomiting with this regimen and appropriate antiemetic therapy is needed because this is a highly emetogenic regimen.  Nonetheless with use of aprepitant as well as Aloxi nausea can be very well-controlled.  There is significant fatigue during the first week of each 3-week cycle.  There is also attention that needs to be paid to the possibility of interstitial lung disease.  This agent should be permanently discontinued if there is grade 2 pulmonary toxicity which would be symptomatic interstitial lung disease.  Grade 1 interstitial lung disease can be treated with steroids but whether trastuzumab deruxtecan should be restarted as a matter of debate.  Discussion of goals of care.  Penny recognizes that her metastatic breast cancer is not curable but is treatable.  Quality of life is very important for her and I did discuss the management of treatment related symptoms on the trastuzumab deruxtecan regimen.  A third-line regimen would be the HER2 climb regimen.  In terms of risk for brain metastases we do not recommend routine screening brain MRI although brain MRI could be considered at the time of disease progression.  This being said she does not have any neurologic signs or symptoms and use of brain MRI in the setting is not mandatory.  I would definitely obtain a brain MRI if she develops neurologic signs or symptoms.  I do recommend continuing with a bone targeted agent such as zoledronic acid because of known bone metastases.  Nonetheless I would be sparing in terms of bone targeted agents given the long natural history of patients treated on the Alesha regimen and the risk of osteonecrosis of the jaw.  She does have good dentition.  Bone health.  I did recommend stretch band, hand weights, exercise to maintain bone density.  Calcium 1 g/day by reading labels and vitamin D3 2000 international units/day.  Genetics.  Given her family history of prostate cancer in her father and a brother  who had multiple head neck cancers as well as lymphoma, germline genetics is strongly recommended.    NGS sequencing is recommended.  Discussion of exercise.  Discussion of the Lace and Pathway.  We recommend 150 minutes of exercise per week with mixed cardio and strength training.  Stretch band, hand weights, yoga.  Diet.  I recommended a diet low in saturated fat, but not low in fat with more fruits and vegetables, and less in the way of red meat.  Palliative care consultation is recommended.  Follow-up: Will be happy to see Penny in follow-up in our clinic on an as requested basis.  She will continue with the excellent care of Dr. John Russell,    Thank you Dr. Russell for allowing us to participate in Penny Lo's care.       Sincerely,      Carlos A Caraballo MD  Professor  [facility]  [phone].            I spent 70 minutes with the patient more than 50% of which was in counseling and coordination of care. The longitudinal plan of care for the diagnosis(es)/condition(s) as documented were addressed during this visit. Due to the added complexity in care, I will continue to support Penny in the subsequent management and with ongoing continuity of care.      Again, thank you for allowing me to participate in the care of your patient.        Sincerely,        Carlos A Caraballo MD

## 2024-04-03 LAB
PATH REPORT.ADDENDUM SPEC: ABNORMAL
PATH REPORT.COMMENTS IMP SPEC: ABNORMAL
PATH REPORT.COMMENTS IMP SPEC: YES
PATH REPORT.FINAL DX SPEC: ABNORMAL
PATH REPORT.GROSS SPEC: ABNORMAL
PATH REPORT.MICROSCOPIC SPEC OTHER STN: ABNORMAL
PATH REPORT.RELEVANT HX SPEC: ABNORMAL
PATH REPORT.RELEVANT HX SPEC: ABNORMAL
PATH REPORT.SITE OF ORIGIN SPEC: ABNORMAL

## 2024-04-03 PROCEDURE — 88377 M/PHMTRC ALYS ISHQUANT/SEMIQ: CPT | Mod: 26 | Performed by: MEDICAL GENETICS

## 2024-04-09 LAB — INTERPRETATION: NORMAL

## 2024-05-19 ENCOUNTER — HEALTH MAINTENANCE LETTER (OUTPATIENT)
Age: 74
End: 2024-05-19

## 2025-06-08 ENCOUNTER — HEALTH MAINTENANCE LETTER (OUTPATIENT)
Age: 75
End: 2025-06-08